# Patient Record
(demographics unavailable — no encounter records)

---

## 2025-04-18 NOTE — ASSESSMENT
[FreeTextEntry1] : Patient is a 38 year old male who is self-referred for recurrent right pleural effusions. He has a history of alcoholic cirrhosis status post liver transplant. He reports no other medical problems. All of his care has been at [facility] and [facility]. He has undergone thoracentesis 3 times and is being managed by his pulmonologist, cardiologist and hepatologist. We have no records and a chest x-ray was obtained today. The x-ray shows a right pleural effusion. The patient reports that his is symptomatic at this time and it is affecting his life and job. At this time, discussed symptomatic treatment and thoracentesis with IR today or tomorrow. Regarding definitive treatment of this effusion, I will have to review his previous testing. He understood. We will refer to IR today and he will request that his records be released for our review. We will follow up after review.   PLAN 1.  Referral to IR for thoracentesis 2.  Obtain and review records 3.  Follow up after review    I, Dr. Brady, personally performed the evaluation and management (E/M) services for this established patient who presents today with (a) new problem(s)/exacerbation of (an) existing condition(s). That E/M includes conducting the clinically appropriate interval history &/or exam, assessing all new/exacerbated conditions, and establishing a new plan of care. Today, my GÓMEZ, [Alona Rutherford], was here to observe my evaluation and management service for this new problem/exacerbated condition and follow the plan of care established by me going forward.

## 2025-04-18 NOTE — HISTORY OF PRESENT ILLNESS
[FreeTextEntry1] : Patient is a 37 yo male with a past medical history of liver transplant 01/2023 secondary to alcoholic cirrhosis. He is referred through [facility] for eval of a recurrent pleural effusion. He recalls a total of 3 effusions requiring thoracentesis starting in May 2024 with most recent in the beginning of April. He underwent evaluation with pulm, cards, and his liver transplant team with no source of the etiology. He experiences SOBE impacting his ADLs with mid to right lateral chest discomfort.   CXR 04/17/2025: Right small-to-moderate pleural effusion and partial atelectasis right lower lung. Right upper lung and left lung clear. No pneumothorax. Upper abdomen surgical clips. No acute bone abnormality.

## 2025-04-18 NOTE — HISTORY OF PRESENT ILLNESS
[FreeTextEntry1] : Patient is a 37 yo male with a past medical history of liver transplant 01/2023 secondary to alcoholic cirrhosis. He is referred through [facility] for eval of a recurrent pleural effusion. He recalls a total of 3 effusions requiring thoracentesis starting in May 2024 with most recent in the beginning of April. He underwent evaluation with pulm, cards, and his liver transplant team with no source of the etiology. He experiences SOBE impacting his ADLs with mid to right lateral chest discomfort.   CXR 04/17/2025: Right small-to-moderate pleural effusion and partial atelectasis right lower lung. Right upper lung and left lung clear. No pneumothorax. Upper abdomen surgical clips. No acute bone abnormality.

## 2025-04-18 NOTE — HISTORY OF PRESENT ILLNESS
[FreeTextEntry1] : Patient is a 37 yo male with a past medical history of liver transplant 01/2023 secondary to alcoholic cirrhosis. He is referred through [facility] for eval of a recurrent pleural effusion. He recalls a total of 3 effusions requiring thoracentesis starting in May 2024 with most recent in the beginning of April. He underwent evaluation with pulm, cards, and his liver transplant team with no source of the etiology. He experiences SOBE impacting his ADLs with mid to right lateral chest discomfort.   CXR 04/17/2025: Right small-to-moderate pleural effusion and partial atelectasis right lower lung. Right upper lung and left lung clear. No pneumothorax. Upper abdomen surgical clips. No acute bone abnormality.

## 2025-04-18 NOTE — SOCIAL HISTORY
[TextEntry] : Lung TB history: denies   COVID hx/vaccination: hx of COVID and is vaccinated    Occupation: employed-  for [employer]    Patient lives with significant other    Patient denies any major mental health history   Alcohol Consumption: denies   Recreational Drug use: denies   Restrictions against blood products: denies any restrictions

## 2025-04-18 NOTE — ASSESSMENT
[FreeTextEntry1] : Patient is a 38 year old male who is self-referred for recurrent right pleural effusions. He has a history of alcoholic cirrhosis status post liver transplant. He reports no other medical problems. All of his care has been at [facility] and [facility]. He has undergone thoracentesis 3 times and is being managed by his pulmonologist, cardiologist and hepatologist. We have no records and a chest x-ray was obtained today. The x-ray shows a right pleural effusion. The patient reports that his is symptomatic at this time and it is affecting his life and job. At this time, discussed symptomatic treatment and thoracentesis with IR today or tomorrow. Regarding definitive treatment of this effusion, I will have to review his previous testing. He understood. We will refer to IR today and he will request that his records be released for our review. We will follow up after review.   PLAN 1.  Referral to IR for thoracentesis 2.  Obtain and review records 3.  Follow up after review    I, Dr. Brady, personally performed the evaluation and management (E/M) services for this established patient who presents today with (a) new problem(s)/exacerbation of (an) existing condition(s). That E/M includes conducting the clinically appropriate interval history &/or exam, assessing all new/exacerbated conditions, and establishing a new plan of care. Today, my GÓMEZ, [Alona Rutherford], was here to observe my evaluation and management service for this new problem/exacerbated condition and follow the plan of care established by me going forward.

## 2025-04-18 NOTE — SOCIAL HISTORY
[TextEntry] : Lung TB history: denies   COVID hx/vaccination: hx of COVID and is vaccinated    Occupation: employed-  for [employer]    Patient lives with significant other    Patient denies any major mental health history   Alcohol Consumption: denies   Recreational Drug use: denies   Restrictions against blood products: denies any restrictions

## 2025-04-18 NOTE — SOCIAL HISTORY
[TextEntry] : Lung TB history: denies   COVID hx/vaccination: hx of COVID and is vaccinated    Occupation: employed-  for [employer]    Patient lives with significant other    Patient denies any major mental health history   Alcohol Consumption: denies   Recreational Drug use: denies   Restrictions against blood products: denies any restrictions

## 2025-04-18 NOTE — ASSESSMENT
[FreeTextEntry1] : Patient is a 38 year old male who is self-referred for recurrent right pleural effusions. He has a history of alcoholic cirrhosis status post liver transplant. He reports no other medical problems. All of his care has been at [facility] and [facility]. He has undergone thoracentesis 3 times and is being managed by his pulmonologist, cardiologist and hepatologist. We have no records and a chest x-ray was obtained today. The x-ray shows a right pleural effusion. The patient reports that his is symptomatic at this time and it is affecting his life and job. At this time, discussed symptomatic treatment and thoracentesis with IR today or tomorrow. Regarding definitive treatment of this effusion, I will have to review his previous testing. He understood. We will refer to IR today and he will request that his records be released for our review. We will follow up after review.   PLAN 1.  Referral to IR for thoracentesis 2.  Obtain and review records 3.  Follow up after review    I, Dr. Brady, personally performed the evaluation and management (E/M) services for this established patient who presents today with (a) new problem(s)/exacerbation of (an) existing condition(s). That E/M includes conducting the clinically appropriate interval history &/or exam, assessing all new/exacerbated conditions, and establishing a new plan of care. Today, my GÓMEZ, [Alona Rutherford], was here to observe my evaluation and management service for this new problem/exacerbated condition and follow the plan of care established by me going forward.

## 2025-04-18 NOTE — PHYSICAL EXAM
[Restricted in physically strenuous activity but ambulatory and able to carry out work of a light or sedentary nature] : Status 1- Restricted in physically strenuous activity but ambulatory and able to carry out work of a light or sedentary nature, e.g., light house work, office work [General Appearance - Alert] : alert [General Appearance - In No Acute Distress] : in no acute distress [Jugular Venous Distention Increased] : there was no jugular-venous distention [] : no respiratory distress [Respiration, Rhythm And Depth] : normal respiratory rhythm and effort [Auscultation Breath Sounds / Voice Sounds] : lungs were clear to auscultation bilaterally [Heart Rate And Rhythm] : heart rate was normal and rhythm regular [Heart Sounds] : normal S1 and S2 [Examination Of The Chest] : the chest was normal in appearance [Chest Visual Inspection Thoracic Asymmetry] : no chest asymmetry [Abnormal Walk] : normal gait [Nail Clubbing] : no clubbing  or cyanosis of the fingernails [Skin Color & Pigmentation] : normal skin color and pigmentation [Sensation] : the sensory exam was normal to light touch and pinprick [Motor Exam] : the motor exam was normal [Oriented To Time, Place, And Person] : oriented to person, place, and time [Affect] : the affect was normal [Mood] : the mood was normal